# Patient Record
Sex: FEMALE | Race: WHITE | URBAN - METROPOLITAN AREA
[De-identification: names, ages, dates, MRNs, and addresses within clinical notes are randomized per-mention and may not be internally consistent; named-entity substitution may affect disease eponyms.]

---

## 2023-10-23 ENCOUNTER — TELEPHONE (OUTPATIENT)
Dept: NEUROLOGY | Facility: CLINIC | Age: 88
End: 2023-10-23

## 2023-10-23 NOTE — TELEPHONE ENCOUNTER
"Patient's daughter, Diego, states on 10/13/2023, patient fell out of her bed and pressed buzzer for nurses. Patient informed she was attempting to go to the restroom.    Patient is in living facility. Nurses found patient on the floor and informed daughter patient was slurring.     Patient was transported to [facility] for stroke eval. Madhuri states patient "past the test." However no imaging was done and she and facility still believe patient had a stroke, per Diego    States she was advised to schedule follow up with neurologist. Asking if Dr. Garcias would see patient for stroke. Patient seen on 04/04/2022 as new patient for Memory loss. No follow up scheduled     Phone: [phone]  "

## 2023-10-24 DIAGNOSIS — I63.89 OTHER CEREBRAL INFARCTION: ICD-10-CM

## 2023-10-24 DIAGNOSIS — R29.818 OTHER SYMPTOMS AND SIGNS INVOLVING THE NERVOUS SYSTEM: Primary | ICD-10-CM

## 2023-10-24 NOTE — TELEPHONE ENCOUNTER
Ct head abb gen please per dtr req   sorry for duplicate messages   order in epic hope I did right   sw dtr just now

## 2023-10-25 NOTE — TELEPHONE ENCOUNTER
----- Message from John Garcias MD sent at 10/24/2023  5:30 PM CDT -----  Ct head abbev please at dtr's req   suspected cva ten d ago

## 2025-08-07 ENCOUNTER — OUTSIDE PLACE OF SERVICE (OUTPATIENT)
Dept: FAMILY MEDICINE | Facility: CLINIC | Age: 89
End: 2025-08-07